# Patient Record
Sex: MALE | Race: BLACK OR AFRICAN AMERICAN
[De-identification: names, ages, dates, MRNs, and addresses within clinical notes are randomized per-mention and may not be internally consistent; named-entity substitution may affect disease eponyms.]

---

## 2019-09-29 ENCOUNTER — HOSPITAL ENCOUNTER (EMERGENCY)
Dept: HOSPITAL 74 - JER | Age: 74
Discharge: HOME | End: 2019-09-29
Payer: COMMERCIAL

## 2019-09-29 VITALS — SYSTOLIC BLOOD PRESSURE: 183 MMHG | TEMPERATURE: 98.1 F | HEART RATE: 68 BPM | DIASTOLIC BLOOD PRESSURE: 80 MMHG

## 2019-09-29 VITALS — BODY MASS INDEX: 27.8 KG/M2

## 2019-09-29 DIAGNOSIS — E78.00: ICD-10-CM

## 2019-09-29 DIAGNOSIS — K11.20: Primary | ICD-10-CM

## 2019-09-29 DIAGNOSIS — I10: ICD-10-CM

## 2019-09-29 DIAGNOSIS — Z85.46: ICD-10-CM

## 2019-09-29 DIAGNOSIS — Z85.038: ICD-10-CM

## 2019-09-29 DIAGNOSIS — E11.9: ICD-10-CM

## 2019-09-29 NOTE — PDOC
History of Present Illness





- General


Chief Complaint: Edema


Stated Complaint: R SIDE FACE SWOLLEN


Time Seen by Provider: 09/29/19 11:06


History Source: Patient


Exam Limitations: Clinical Condition





- History of Present Illness


Initial Comments: 





09/29/19 12:00


With history of hypertension on lisinopril and metoprolol, diabetes present 

with complaint of right-sided facial swelling, swelling right side neck lymph 

nodes and tonsils upon wake this morning. Patient denies any pain to face or 

neck. Denies any throat pain. Denies shortness of breath, fever, chills, 

choking sensation. Denies any other medication. Patient reported started using 

CBD topical cream 2 days ago for rash to the skin of the right leg. Denies any 

other symptoms








Is this a multiple visit Asthma Patient?: No


Timing/Duration: 4-6 hours





Past History





- Past Medical History


Allergies/Adverse Reactions: 


 Allergies











Allergy/AdvReac Type Severity Reaction Status Date / Time


 


No Known Allergies Allergy   Verified 04/23/14 10:55











Anemia: No


Asthma: No


Cancer: Yes (PROSTATE / COLON)


Cardiac Disorders: No


CVA: No


COPD: No


CHF: No


Dementia: No


Diabetes: Yes (DX 2010)


GI Disorders: Yes (COLON CANCER)


 Disorders: Yes (PROSTATE CANCER)


HTN: Yes (DX 2003)


Hypercholesterolemia: Yes (DX 2011)


Liver Disease: No


Seizures: No


Thyroid Disease: No





- Surgical History


Abdominal Surgery: Yes (COLON RESECTION/2012)


Appendectomy: No


Cardiac Surgery: No


Cholecystectomy: No


Lung Surgery: No


Neurologic Surgery: No


Orthopedic Surgery: No





- Psycho Social/Smoking Cessation Hx


Smoking History: Never smoked


Have you smoked in the past 12 months: No


Number of Cigarettes Smoked Daily: 0


If you are a former smoker, when did you quit?: 1994


Hx Alcohol Use: Yes (RARE)


Drug/Substance Use Hx: No


Substance Use Type: Alcohol


Hx Substance Use Treatment: No





**Review of Systems





- Review of Systems


Able to Perform ROS?: Yes


Is the patient limited English proficient: No


Constitutional: No: Chills, Fever, Malaise


HEENTM: Yes: Symptoms Reported, See HPI, Other (righ side facial swelling).  No

: Eye Pain, Blurred Vision, Tearing, Recent change in vision, Double Vision, 

Cataracts, Ear Pain, Ocular Prothesis, Ear Discharge, Nose Pain, Nose Congestion

, Tinnitus, Nose Bleeding, Hearing Loss, Throat Pain, Throat Swelling, Mouth 

Pain, Dental Problems, Difficulty Swallowing, Mouth Swelling


Respiratory: No: Symptoms reported, See HPI, Cough, Orthopnea, Shortness of 

Breath, SOB with Exertion, SOB at Rest, Stridor, Wheezing, Productive cough, 

Hemoptysis, Other


Cardiac (ROS): No: Symptoms Reported, See HPI, Chest Pain, Edema, Irregular 

Heart Rate, Lightheadedness, Palpitations, Syncope, Chest Tightness, Other


ABD/GI: No: Nausea, Vomiting


: No: Symptoms Reported


Musculoskeletal: No: Symptoms Reported


Integumentary: Yes: Symptoms Reported, Other (right side facial swelling)


Neurological: No: Symptoms reported, Seizure, Tremors, Dizziness


Hematologic/Lymphatic: Yes: Symptoms Reported, See HPI, Lymph Node 

Abnormalities (right anterior lymph node swelling)


All Other Systems: Reviewed and Negative





*Physical Exam





- Vital Signs


 Last Vital Signs











Temp Pulse Resp BP Pulse Ox


 


 98.1 F   68   18   183/80 H  99 


 


 09/29/19 10:15  09/29/19 10:15  09/29/19 10:15  09/29/19 10:15  09/29/19 10:15














- Physical Exam


Comments: 





09/29/19 12:05


GENERAL:


Well developed, well nourished. Awake and alert. No acute distress.


HEENT: Moderately enlarged right tonsils. Normocephalic, atraumatic. PERRLA, 

EOMI. No conjunctival pallor. Sclera are non-icteric. Moist mucous membranes. 

Oropharynx is clear.


NECK: 


Anterior cervical lymphadenitis. Full ROM. 


CARDIOVASCULAR:


Regular rate and rhythm. No murmurs, rubs, or gallops. Distal pulses are 2+ and 

symmetric. 


PULMONARY: 


No evidence of respiratory distress. Lungs clear to auscultation bilaterally. 

No wheezing, rales or rhonchi.


ABDOMINAL:


Soft. Non-tender. Non-distended. No rebound or guarding. No organomegaly. 

Normoactive bowel sounds. 


MUSCULOSKELETAL 


Normal range of motion at all joints. 


SKIN: 


Warm and dry. Normal capillary refill. Mild swelling to right side of face and 

right lower chin over right parotid gland. no palpaple calculi.


NEUROLOGICAL: 


Alert, awake, appropriate.  Gait is normal without ataxia.


PSYCHIATRIC: 


Cooperative. Good eye contact. Appropriate mood 








General Appearance: Yes: Nourished, Appropriately Dressed.  No: Apparent 

Distress





ED Treatment Course





- Medications


Given in the ED: 


ED Medications














Discontinued Medications














Generic Name Dose Route Start Last Admin





  Trade Name Vamshi  PRN Reason Stop Dose Admin


 


Dexamethasone  10 mg  09/29/19 11:18  09/29/19 11:25





  Decadron Liquid -  PO  09/29/19 11:19  10 mg





  ONCE ONE   Administration





     





     





     





     














Medical Decision Making





- Medical Decision Making





09/29/19 12:01


With history of hypertension on lisinopril and metoprolol, diabetes present 

with complaint of right-sided facial swelling, swelling right side neck lymph 

nodes and tonsils upon wake this morning. Patient denies any pain to face or 

neck. Denies any throat pain. Denies shortness of breath, fever, chills, 

choking sensation. Denies any other medication. Patient reported started using 

CBD topical cream 2 days ago for rash to the skin of the right leg. Denies any 

other symptoms





Exam significant for mild swelling to right side of face and chin with right 

anterior cervical lymphadenopathy. Moderate swelling to right tonsils. Airway 

patent. Patient afebrile. No gum swelling or tooth tenderness. No palpable 

lesion to swallow declined. Patient reported having similar episode 2 years ago 

which resolved spontaneously. Symptoms likely reaction to symptom patient came 

in contact with or  lymphadenitis and tonsillitis causing facial swelling.


Rapid strep ordered to rule out strep pharyngitis. Decadron 10 mg by mouth 

given. Reassess after half an hour


09/29/19 12:43


rapid strep negative. Patient with mild swelling of right parotid gland. 

Symptoms likely sialadenitis. Patient stable for discharge to do lemon drops 

candy to help stimulate salivary production to open blocked duct. Plan 

discussed with patient and patient agrees with plan. Patient advised to apply 

warm compress and massage salivary gland. referred for ENT given for persistent 

symptoms





Discharge





- Discharge Information


Problems reviewed: Yes


Clinical Impression/Diagnosis: 


 Sialadenitis, Salivary disorder





Condition: Stable


Disposition: HOME





- Admission


No





- Follow up/Referral


Referrals: 


Con Corrales MD [Staff Physician] - 





- Patient Discharge Instructions


Patient Printed Discharge Instructions:  Parotitis


Additional Instructions: 


 Your swelling is likely caused by blocked salivary gland. use lemon drop candy 

or losenges to help open up salivary gland. Apply warm compress 2-3times a day 

to right side of face and massage area of swelling to help release blocked 

duct. Follow-up with referred ENT if symptoms persist for more than 4 days





- Post Discharge Activity

## 2022-05-21 ENCOUNTER — HOSPITAL ENCOUNTER (EMERGENCY)
Dept: HOSPITAL 74 - JER | Age: 77
Discharge: HOME | End: 2022-05-21
Payer: COMMERCIAL

## 2022-05-21 VITALS — HEART RATE: 74 BPM | SYSTOLIC BLOOD PRESSURE: 147 MMHG | DIASTOLIC BLOOD PRESSURE: 76 MMHG

## 2022-05-21 VITALS — TEMPERATURE: 98.1 F

## 2022-05-21 VITALS — BODY MASS INDEX: 22.3 KG/M2

## 2022-05-21 DIAGNOSIS — T78.3XXA: Primary | ICD-10-CM

## 2022-05-21 PROCEDURE — 3E033GC INTRODUCTION OF OTHER THERAPEUTIC SUBSTANCE INTO PERIPHERAL VEIN, PERCUTANEOUS APPROACH: ICD-10-PCS

## 2022-05-21 PROCEDURE — 3E033NZ INTRODUCTION OF ANALGESICS, HYPNOTICS, SEDATIVES INTO PERIPHERAL VEIN, PERCUTANEOUS APPROACH: ICD-10-PCS
